# Patient Record
Sex: FEMALE | Race: WHITE | Employment: FULL TIME | URBAN - METROPOLITAN AREA
[De-identification: names, ages, dates, MRNs, and addresses within clinical notes are randomized per-mention and may not be internally consistent; named-entity substitution may affect disease eponyms.]

---

## 2022-06-09 ENCOUNTER — HOSPITAL ENCOUNTER (EMERGENCY)
Age: 27
Discharge: HOME | End: 2022-06-10

## 2022-06-09 ENCOUNTER — APPOINTMENT (OUTPATIENT)
Dept: GENERAL RADIOLOGY | Age: 27
End: 2022-06-09

## 2022-06-09 VITALS
HEART RATE: 100 BPM | RESPIRATION RATE: 20 BRPM | SYSTOLIC BLOOD PRESSURE: 128 MMHG | BODY MASS INDEX: 20.76 KG/M2 | TEMPERATURE: 98.4 F | DIASTOLIC BLOOD PRESSURE: 74 MMHG | OXYGEN SATURATION: 100 % | WEIGHT: 145 LBS | HEIGHT: 70 IN

## 2022-06-09 DIAGNOSIS — S80.00XA CONTUSION OF KNEE, UNSPECIFIED LATERALITY, INITIAL ENCOUNTER: ICD-10-CM

## 2022-06-09 DIAGNOSIS — T07.XXXA MULTIPLE ABRASIONS: ICD-10-CM

## 2022-06-09 DIAGNOSIS — Z23 ENCOUNTER FOR VACCINATION: ICD-10-CM
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
## 2022-06-09 PROCEDURE — 99284 EMERGENCY DEPT VISIT MOD MDM: CPT

## 2022-06-09 PROCEDURE — 90714 TD VACC NO PRESV 7 YRS+ IM: CPT | Performed by: NURSE PRACTITIONER

## 2022-06-09 PROCEDURE — 6360000002 HC RX W HCPCS: Performed by: NURSE PRACTITIONER

## 2022-06-09 PROCEDURE — 96372 THER/PROPH/DIAG INJ SC/IM: CPT

## 2022-06-09 PROCEDURE — 73560 X-RAY EXAM OF KNEE 1 OR 2: CPT

## 2022-06-09 PROCEDURE — 90471 IMMUNIZATION ADMIN: CPT | Performed by: NURSE PRACTITIONER

## 2022-06-09 RX ORDER — KETOROLAC TROMETHAMINE 30 MG/ML
30 INJECTION, SOLUTION INTRAMUSCULAR; INTRAVENOUS ONCE
Status: COMPLETED | OUTPATIENT
Start: 2022-06-09 | End: 2022-06-09

## 2022-06-09 RX ORDER — CEPHALEXIN 500 MG/1
500 CAPSULE ORAL 4 TIMES DAILY
Qty: 28 CAPSULE | Refills: 0 | Status: SHIPPED | OUTPATIENT
Start: 2022-06-09 | End: 2022-06-10 | Stop reason: SDUPTHER

## 2022-06-09 RX ORDER — NAPROXEN 500 MG/1
500 TABLET ORAL 2 TIMES DAILY WITH MEALS
Qty: 14 TABLET | Refills: 0 | Status: SHIPPED | OUTPATIENT
Start: 2022-06-09 | End: 2022-06-10 | Stop reason: SDUPTHER

## 2022-06-09 RX ORDER — TETANUS AND DIPHTHERIA TOXOIDS ADSORBED 2; 2 [LF]/.5ML; [LF]/.5ML
0.5 INJECTION INTRAMUSCULAR ONCE
Status: COMPLETED | OUTPATIENT
Start: 2022-06-09 | End: 2022-06-09

## 2022-06-09 RX ORDER — OXYCODONE HYDROCHLORIDE AND ACETAMINOPHEN 5; 325 MG/1; MG/1
1 TABLET ORAL ONCE
Status: COMPLETED | OUTPATIENT
Start: 2022-06-09 | End: 2022-06-10

## 2022-06-09 RX ORDER — DIAPER,BRIEF,INFANT-TODD,DISP
EACH MISCELLANEOUS ONCE
Status: COMPLETED | OUTPATIENT
Start: 2022-06-09 | End: 2022-06-10

## 2022-06-09 RX ADMIN — TETANUS AND DIPHTHERIA TOXOIDS ADSORBED 0.5 ML: 2; 2 INJECTION INTRAMUSCULAR at 22:37

## 2022-06-09 RX ADMIN — KETOROLAC TROMETHAMINE 30 MG: 30 INJECTION, SOLUTION INTRAMUSCULAR at 22:39

## 2022-06-09 ASSESSMENT — PAIN SCALES - GENERAL
PAINLEVEL_OUTOF10: 7
PAINLEVEL_OUTOF10: 7

## 2022-06-09 ASSESSMENT — PAIN DESCRIPTION - LOCATION
LOCATION: KNEE
LOCATION: KNEE

## 2022-06-09 ASSESSMENT — PAIN DESCRIPTION - DESCRIPTORS
DESCRIPTORS: THROBBING;PRESSURE;SORE
DESCRIPTORS: BURNING;ACHING

## 2022-06-09 ASSESSMENT — PAIN DESCRIPTION - ORIENTATION
ORIENTATION: RIGHT
ORIENTATION: RIGHT

## 2022-06-09 ASSESSMENT — PAIN - FUNCTIONAL ASSESSMENT: PAIN_FUNCTIONAL_ASSESSMENT: 0-10

## 2022-06-10 PROCEDURE — 6370000000 HC RX 637 (ALT 250 FOR IP): Performed by: EMERGENCY MEDICINE

## 2022-06-10 PROCEDURE — 6370000000 HC RX 637 (ALT 250 FOR IP)

## 2022-06-10 PROCEDURE — 6370000000 HC RX 637 (ALT 250 FOR IP): Performed by: NURSE PRACTITIONER

## 2022-06-10 RX ORDER — CEPHALEXIN 500 MG/1
500 CAPSULE ORAL 4 TIMES DAILY
Qty: 40 CAPSULE | Refills: 0 | Status: SHIPPED | OUTPATIENT
Start: 2022-06-10 | End: 2022-06-20

## 2022-06-10 RX ORDER — NAPROXEN 500 MG/1
500 TABLET ORAL 2 TIMES DAILY WITH MEALS
Qty: 14 TABLET | Refills: 0 | Status: SHIPPED | OUTPATIENT
Start: 2022-06-10 | End: 2022-06-17

## 2022-06-10 RX ORDER — LIDOCAINE AND PRILOCAINE 25; 25 MG/G; MG/G
CREAM TOPICAL ONCE
Status: COMPLETED | OUTPATIENT
Start: 2022-06-10 | End: 2022-06-10

## 2022-06-10 RX ORDER — LIDOCAINE AND PRILOCAINE 25; 25 MG/G; MG/G
CREAM TOPICAL
Status: COMPLETED
Start: 2022-06-10 | End: 2022-06-10

## 2022-06-10 RX ADMIN — LIDOCAINE AND PRILOCAINE: 25; 25 CREAM TOPICAL at 00:16

## 2022-06-10 RX ADMIN — OXYCODONE AND ACETAMINOPHEN 1 TABLET: 5; 325 TABLET ORAL at 00:13

## 2022-06-10 RX ADMIN — LIDOCAINE AND PRILOCAINE: 25; 25 CREAM TOPICAL at 00:37

## 2022-06-10 RX ADMIN — BACITRACIN ZINC: 500 OINTMENT TOPICAL at 00:55

## 2022-06-10 ASSESSMENT — PAIN DESCRIPTION - LOCATION: LOCATION: KNEE

## 2022-06-10 ASSESSMENT — PAIN DESCRIPTION - ORIENTATION: ORIENTATION: RIGHT

## 2022-06-10 ASSESSMENT — PAIN DESCRIPTION - DESCRIPTORS: DESCRIPTORS: BURNING;ACHING

## 2022-06-10 ASSESSMENT — PAIN SCALES - GENERAL: PAINLEVEL_OUTOF10: 10

## 2022-06-10 NOTE — ED NOTES
Patient needed her prescriptions printed as she has no pharmacy here in town, reviewed [facility] and filled her prescriptions     Homa Shipley DO  06/10/22 0057

## 2022-06-10 NOTE — ED PROVIDER NOTES
[address]  Department of Emergency Medicine   ED  Encounter Note  Admit Date/RoomTime: 2022  9:49 PM  ED Room: [facility]    NAME: Get Garcia  : 1995  MRN: [id number]     Chief Complaint:  Fall (chasing sister and hit sidewalk and injured right knee with open wound.)    History of Present Illness       Get Garcia is a 27 y.o. old female who presents to the emergency department for evaluation of a mechanical fall. Patient was chasing after her 3year-old sister when she slipped on gravel and fell to the ground. She denies head injury, loss consciousness, neck pain, back pain, chest pain, abdominal pain, numbness or weakness. She has been ambulatory since her fall and admits to a large abrasion over her right knee. She does not have foreign body sensation and reports her last tetanus vaccination to be right around 5 years ago. Her pain is aggravated by ambulation and movement. She has not had any over-the-counter intervention for symptoms. Her symptoms are moderate in severity and persistent in nature. ROS   Pertinent positives and negatives are stated within HPI, all other systems reviewed and are negative. Past Medical History:  has no past medical history on file. Surgical History:  has no past surgical history on file. Social History:  reports that she has been smoking. She has never used smokeless tobacco.    Family History: family history is not on file. Allergies: Patient has no known allergies. Physical Exam   Oxygen Saturation Interpretation: Normal.        ED Triage Vitals   BP Temp Temp Source Heart Rate Resp SpO2 Height Weight   22 2135 22 2135 22 21322 21322 21322 21322 21422 214   128/74 98.4 °F (36.9 °C) Oral 100 20 100 % 5' 10\" (1.778 m) 145 lb (65.8 kg)         Constitutional:  Alert, development consistent with age. HEENT:  NC/NT.  No outward sign of trauma. Airway patent. Neck/Back:  Normal ROM. Supple. No midline vertebral tenderness, step-off or crepitus through the cervical, thoracic or lumbar spine. Respiratory: No respiratory distress or increased work of breathing. Cardiovascular: Regular rate and rhythm. Strong distal pulses. Musculoskeletal: No pain upon palpation of the bilateral upper extremities. There abrasions to the bilateral thenar eminence with no bony tenderness to the forearm, wrist or hands. Strong bilateral radial and ulnar pulses. Full range of motion of the wrists. No pain upon palpation of the pelvis. Right Lower Extremity(s):               Tenderness: There is no bony tenderness to the right lower extremity except to the patella with a large abrasion and two jagged partial thickness skin flaps of less than 5 mm . There is no laceration that is able to be repaired. No foreign body. Quadriceps and patellar tendon intact. Swelling: None. Calf:  No evidence of DVT seen on physical exam. Negative Vinny's sign. No cords or calf tenderness. No significant calf/ankle edema. Deformity: no deformity observed/palpated. ROM: diminished range secondary to pain. Negative anterior and posterior drawer             Skin: Large abrasion without foreign body, ecchymosis, erythema or swelling. Neurovascular: Motor deficit: none. Sensory deficit: none. Pulse deficit: none. Capillary refill: normal.  Left Lower Extremity(s):              Tenderness: There is no bony tenderness to the left lower extremity except to the proximal fibula with an associated abrasion. Swelling: None. Calf:  No evidence of DVT seen on physical exam. Negative Vinny's sign. No cords or calf tenderness. No significant calf/ankle edema. .             Deformity: no deformity observed/palpated. ROM: full range with pain. Skin: Abrasion of the proximal fibula without ecchymosis, edema or erythema. Neurovascular: Motor deficit: none. Sensory deficit: none. Pulse deficit: none. Capillary refill: normal.  Gait:  limp due to affected limb. Neurological:  Alert and oriented. Motor and sensory functions intact unless otherwise described above. Lab / Imaging Results   (All laboratory and radiology results have been personally reviewed by myself)  Labs:  No results found for this visit on 06/09/22. Imaging: All Radiology results interpreted by Radiologist unless otherwise noted. XR KNEE RIGHT (1-2 VIEWS)   Final Result   No acute fracture is identified. Suspicious for joint effusion. RECOMMENDATION:   Consider MRI for internal derangement and occult fracture evaluation. XR KNEE LEFT (1-2 VIEWS)   Final Result   No acute fracture is identified. ED Course / Medical Decision Making     Medications   bacitracin zinc ointment (has no administration in time range)   oxyCODONE-acetaminophen (PERCOCET) 5-325 MG per tablet 1 tablet (has no administration in time range)   diptheria-tetanus toxoids [facility]) 2-2 LF/0.5ML injection 0.5 mL (0.5 mLs IntraMUSCular Given 6/9/22 2237)   ketorolac (TORADOL) injection 30 mg (30 mg IntraMUSCular Given 6/9/22 2239)          Re-examination:  6/9/22     Time: 2320  Patients symptoms are improving. Repeat physical examination has slightly improved. Right knee wound was cleaned with no obvious foreign bodies. The 2 small jagged skin flaps that are unable to be repaired with sutures were trimmed as consented by patient with no bleeding. Discussed results and treatment plan. Patient declines crutches. Consults:   None    Procedure(s):  None    MDM:      Neurovascularly intact. Imaging was obtained based as per history and physical exam findings.  Interpretation as per radiologist negative for acute fracture as

## 2022-06-10 NOTE — ED NOTES
Rt knee numbed with EMLA cream then scrubbed with betadine scrub brush. Xeroform dressing applied with bacitracin and bulking telfa dressing applied.       Jacqueline Hickey RN  06/10/22 Pancho Klein RN  06/10/22 0830